# Patient Record
Sex: MALE | Race: WHITE | NOT HISPANIC OR LATINO
[De-identification: names, ages, dates, MRNs, and addresses within clinical notes are randomized per-mention and may not be internally consistent; named-entity substitution may affect disease eponyms.]

---

## 2022-02-08 ENCOUNTER — DASHBOARD ENCOUNTERS (OUTPATIENT)
Age: 67
End: 2022-02-08

## 2022-02-08 ENCOUNTER — WEB ENCOUNTER (OUTPATIENT)
Dept: URBAN - METROPOLITAN AREA CLINIC 128 | Facility: CLINIC | Age: 67
End: 2022-02-08

## 2022-02-08 ENCOUNTER — OFFICE VISIT (OUTPATIENT)
Dept: URBAN - METROPOLITAN AREA CLINIC 128 | Facility: CLINIC | Age: 67
End: 2022-02-08
Payer: MEDICARE

## 2022-02-08 DIAGNOSIS — Z80.0 FAMILY HISTORY OF COLON CANCER IN FATHER: ICD-10-CM

## 2022-02-08 DIAGNOSIS — R93.89 ABNORMAL CT SCAN: ICD-10-CM

## 2022-02-08 DIAGNOSIS — K21.9 GASTROESOPHAGEAL REFLUX DISEASE WITHOUT ESOPHAGITIS: ICD-10-CM

## 2022-02-08 DIAGNOSIS — Z86.010 PERSONAL HISTORY OF COLONIC POLYPS: ICD-10-CM

## 2022-02-08 DIAGNOSIS — R59.0 LYMPHADENOPATHY, RETROPERITONEAL: ICD-10-CM

## 2022-02-08 DIAGNOSIS — R16.1 SPLENOMEGALY: ICD-10-CM

## 2022-02-08 DIAGNOSIS — K76.0 FATTY LIVER: ICD-10-CM

## 2022-02-08 DIAGNOSIS — Z90.49 HISTORY OF LAPAROSCOPIC APPENDECTOMY: ICD-10-CM

## 2022-02-08 PROBLEM — 197321007: Status: ACTIVE | Noted: 2022-02-08

## 2022-02-08 PROBLEM — 266435005: Status: ACTIVE | Noted: 2022-02-08

## 2022-02-08 PROBLEM — 129679001: Status: ACTIVE | Noted: 2022-02-08

## 2022-02-08 PROCEDURE — 99204 OFFICE O/P NEW MOD 45 MIN: CPT | Performed by: PHYSICIAN ASSISTANT

## 2022-02-08 RX ORDER — DULOXETINE 30 MG/1
TAKE 1 CAPSULE (30 MG) BY ORAL ROUTE ONCE DAILY CAPSULE, DELAYED RELEASE PELLETS ORAL 1
Qty: 0 | Refills: 0 | Status: ON HOLD | COMMUNITY
Start: 1900-01-01

## 2022-02-08 NOTE — HPI-TODAY'S VISIT:
The patient is a new patient who is here for a hospital follow up visit. He was seen at [facility] on 1/18/22 for abdominal pain. His outpatient CT scan revealed appendicits. He was sent by his PCP to the ED to have an appendectomy by done by Dr. Betts. HIs abdominal and pelvic CT scan revealaed enlarged appendix with appendicoliths, likely early appendiciis, interval development of mild retroperitoneal adenopathy, cannot exclude metastatic disease, severe, hepatic steatosis, mild splenomegaly, and query mild cirrhotic morphology of liver. No  recurrent renal mass is seen.  His WBC was 11.2. Alk phos was 75, ASST 32, ALT 36. He underwent a laparscopic appendectomy and has had his post operative follow up and been healing well. HIs last EGD was 2017 and it revealed gastritis. His last colonoscopy was 2017 and he had colon polyps. He elicits drinking 4-5 drinks of alcohol a week. His hematologist/oncologist Dr. Cormier will be doing a CT scan in 3 months to closely check on the lymphadenopathy noted on CT scan for surveillance. He has known DM type 2. He has a history of renal cell carcinoma.

## 2022-02-10 PROBLEM — 428283002: Status: ACTIVE | Noted: 2022-02-08

## 2022-02-10 LAB
A/G RATIO: 1.5
ACTIN (SMOOTH MUSCLE) ANTIBODY: 8
ALBUMIN: 4.8
ALKALINE PHOSPHATASE: 75
ALPHA-1-ANTITRYPSIN, SERUM: 170
ALT (SGPT): 37
ANA DIRECT: NEGATIVE
AST (SGOT): 32
BASO (ABSOLUTE): 0.1
BASOS: 1
BILIRUBIN, TOTAL: 0.7
BUN/CREATININE RATIO: 15
BUN: 17
CALCIUM: 10.1
CARBON DIOXIDE, TOTAL: 23
CERULOPLASMIN: 25.4
CHLORIDE: 94
CREATININE: 1.16
EGFR IF AFRICN AM: 77
EGFR IF NONAFRICN AM: 66
EOS (ABSOLUTE): 0.3
EOS: 3
FERRITIN, SERUM: 226
GLOBULIN, TOTAL: 3.2
GLUCOSE: 94
HBSAG SCREEN: NEGATIVE
HCV AB: <0.1
HEMATOCRIT: 43.7
HEMATOLOGY COMMENTS:: (no result)
HEMOGLOBIN: 15
HEP A AB, IGM: NEGATIVE
HEP B CORE AB, IGM: NEGATIVE
IMMATURE CELLS: (no result)
IMMATURE GRANS (ABS): 0
IMMATURE GRANULOCYTES: 0
INR: 1.1
INTERPRETATION:: (no result)
IRON BIND.CAP.(TIBC): 344
IRON SATURATION: 21
IRON: 71
LYMPHS (ABSOLUTE): 1.2
LYMPHS: 12
MCH: 30.7
MCHC: 34.3
MCV: 90
MITOCHONDRIAL (M2) ANTIBODY: 69.3
MONOCYTES(ABSOLUTE): 0.8
MONOCYTES: 8
NEUTROPHILS (ABSOLUTE): 7.6
NEUTROPHILS: 76
NRBC: (no result)
PLATELETS: 248
POTASSIUM: 4
PROTEIN, TOTAL: 8
PROTHROMBIN TIME: 11.1
RBC: 4.88
RDW: 12.7
SODIUM: 137
UIBC: 273
WBC: 10

## 2022-02-16 ENCOUNTER — CLAIMS CREATED FROM THE CLAIM WINDOW (OUTPATIENT)
Dept: URBAN - METROPOLITAN AREA SURGERY CENTER 31 | Facility: SURGERY CENTER | Age: 67
End: 2022-02-16
Payer: MEDICARE

## 2022-02-16 DIAGNOSIS — Z80.0 BROTHER AT YOUNG AGE FAMILY HISTORY OF COLON CANCER: ICD-10-CM

## 2022-02-16 DIAGNOSIS — Z86.010 ADENOMAS PERSONAL HISTORY OF COLONIC POLYPS: ICD-10-CM

## 2022-02-16 PROCEDURE — G0105 COLORECTAL SCRN; HI RISK IND: HCPCS | Performed by: INTERNAL MEDICINE

## 2022-02-16 PROCEDURE — G8907 PT DOC NO EVENTS ON DISCHARG: HCPCS | Performed by: INTERNAL MEDICINE

## 2022-02-24 ENCOUNTER — TELEPHONE ENCOUNTER (OUTPATIENT)
Dept: URBAN - METROPOLITAN AREA CLINIC 92 | Facility: CLINIC | Age: 67
End: 2022-02-24

## 2022-02-24 RX ORDER — HYOSCYAMINE SULFATE 0.12 MG/1
1 TABLET AS NEEDED TABLET ORAL EVERY 6 HOURS PRN
Qty: 60 | Refills: 1 | OUTPATIENT
Start: 2022-02-24 | End: 2022-03-24

## 2022-02-24 RX ORDER — DULOXETINE 30 MG/1
TAKE 1 CAPSULE (30 MG) BY ORAL ROUTE ONCE DAILY CAPSULE, DELAYED RELEASE PELLETS ORAL 1
Qty: 0 | Refills: 0 | Status: ON HOLD | COMMUNITY
Start: 1900-01-01

## 2022-02-24 NOTE — HPI-TODAY'S VISIT:
colonoscopy 8-9 days ago, appendectomy 4 weeks ago.  Onset of watery diarrhea with pelvic cramping and rectal spasms for the past 2-3 days.  No bleeding. Temp up to 100.1F.  No N/V but decreased appetite.  Has cirrhosis new diagnosis on imaging--DM and overweight, some etoh.  no sick contacts but was around several grandchildren this weekend.

## 2022-03-15 ENCOUNTER — OFFICE VISIT (OUTPATIENT)
Dept: URBAN - METROPOLITAN AREA CLINIC 86 | Facility: CLINIC | Age: 67
End: 2022-03-15

## 2022-05-24 ENCOUNTER — OFFICE VISIT (OUTPATIENT)
Dept: URBAN - METROPOLITAN AREA TELEHEALTH 2 | Facility: TELEHEALTH | Age: 67
End: 2022-05-24

## 2024-01-10 ENCOUNTER — OFFICE VISIT (OUTPATIENT)
Dept: URBAN - METROPOLITAN AREA CLINIC 86 | Facility: CLINIC | Age: 69
End: 2024-01-10